# Patient Record
Sex: MALE | Race: WHITE | URBAN - METROPOLITAN AREA
[De-identification: names, ages, dates, MRNs, and addresses within clinical notes are randomized per-mention and may not be internally consistent; named-entity substitution may affect disease eponyms.]

---
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
## 2021-08-14 ENCOUNTER — NURSE ONLY (OUTPATIENT)
Dept: CARDIOLOGY CLINIC | Age: 21
End: 2021-08-14
Payer: COMMERCIAL

## 2021-08-14 DIAGNOSIS — Z02.5 SPORTS PHYSICAL: Primary | ICD-10-CM

## 2021-08-14 PROCEDURE — 93000 ELECTROCARDIOGRAM COMPLETE: CPT | Performed by: INTERNAL MEDICINE

## 2022-08-10 DIAGNOSIS — Z02.5 SPORTS PHYSICAL: Primary | ICD-10-CM

## 2022-08-10 PROCEDURE — 93000 ELECTROCARDIOGRAM COMPLETE: CPT | Performed by: INTERNAL MEDICINE

## 2022-10-10 ENCOUNTER — OFFICE VISIT (OUTPATIENT)
Dept: ORTHOPEDIC SURGERY | Age: 22
End: 2022-10-10
Payer: COMMERCIAL

## 2022-10-10 VITALS — HEIGHT: 73 IN | BODY MASS INDEX: 25.98 KG/M2 | WEIGHT: 196 LBS

## 2022-10-10 DIAGNOSIS — M25.551 RIGHT HIP PAIN: Primary | ICD-10-CM

## 2022-10-10 DIAGNOSIS — M89.8X8 ILIAC CREST BONE PAIN: ICD-10-CM

## 2022-10-10 PROCEDURE — 99204 OFFICE O/P NEW MOD 45 MIN: CPT | Performed by: EMERGENCY MEDICINE

## 2022-10-10 PROCEDURE — G8419 CALC BMI OUT NRM PARAM NOF/U: HCPCS | Performed by: EMERGENCY MEDICINE

## 2022-10-10 PROCEDURE — G8484 FLU IMMUNIZE NO ADMIN: HCPCS | Performed by: EMERGENCY MEDICINE

## 2022-10-10 PROCEDURE — G8427 DOCREV CUR MEDS BY ELIG CLIN: HCPCS | Performed by: EMERGENCY MEDICINE

## 2022-10-10 PROCEDURE — 1036F TOBACCO NON-USER: CPT | Performed by: EMERGENCY MEDICINE

## 2022-10-10 ASSESSMENT — ENCOUNTER SYMPTOMS
RHINORRHEA: 0
COUGH: 0
ABDOMINAL PAIN: 0
SHORTNESS OF BREATH: 0

## 2022-10-10 NOTE — PROGRESS NOTES
NEW PATIENT VISIT  CC: Hip Pain (RIGHT HIP)    Referring Provider: No ref. provider found    HPI:    Katie Lynne is a 25 y.o. male who presents for evaluation of right hip pain. In a game 3 days ago, he was hit in the right shoulder and felt like he twisted wrong and then had onset of right hip pain afterwards. There was concern that he may have been hit in the right hip. However, after reviewing the video, no trauma to the right hip. He reports pain over the right iliac crest and right ASIS. He had been using tape over the affected area but then had an allergic reaction. He reports 3 out of 10 right sided pain. No particular hip joint pain. He has never had anything like this before. No other complaints. History reviewed. No pertinent past medical history. Social History  Plays ice hockey at [employer]    Medications  No current outpatient medications on file. No current facility-administered medications for this visit. Allergies  No Known Allergies    Review of Systems:  Review of Systems   Constitutional:  Negative for chills and fever. HENT:  Negative for congestion and rhinorrhea. Respiratory:  Negative for cough and shortness of breath. Cardiovascular:  Negative for chest pain. Gastrointestinal:  Negative for abdominal pain. Musculoskeletal:  Negative for joint swelling and myalgias. Right hip pain   Skin:  Negative for rash. Neurological:  Negative for dizziness and light-headedness.      Physical Examination:  General: Well appearing male, in no acute distress  Respiratory: Normal respiratory effort  Cardiovascular: No visual or palpable edema  Skin: no identified rashes, no induration, erythema or cyanosis  Neurologic: Light touch sensation is intact, no allodynia or hyperalgesia  Gait: Normal gait and station  Extremities: No evidence of clubbing, cyanosis, tenosynovitis or nail pitting  MSK: Right hip/pelvis: Tenderness to palpation over the iliac crest and ASIS, no pain with range of motion of the hip, no instability of the hip, mild exacerbation of symptoms with oblique stressing, no palpable hernia, no palpable step-offs    Radiology:  3 view X-rays of the right hip and pelvis dated 10/10/2022 were reviewed independently and discussed with the patient. The films revealed: No acute osseous abnormalities    Assessment/Treatment Plan: Johanna Judd is a 25 y.o. male with:    1. Right hip pain  -     XR HIP 2-3 VW W PELVIS RIGHT; Future  -     MRI PELVIS WO CONTRAST; Future  2. Iliac crest bone pain    Patient was seen and examined in the office today. He is complaining of right hip pain. He is not particularly painful within the right hip joint. Instead, he has tenderness to palpation over the right iliac crest and right ASIS. Concern for a hip pointer or muscle strain. X-rays are negative for acute pathology. At this time, I would like to proceed with an MRI right pelvis. Symptom control at home. Limited activity secondary to pain. We will see the patient back in 1 week for MRI review. Discussed with patient's . Orders Placed This Encounter   Procedures    XR HIP 2-3 VW W PELVIS RIGHT     Standing Status:   Future     Number of Occurrences:   1     Standing Expiration Date:   11/10/2022     Order Specific Question:   Reason for exam:     Answer:   Pain    MRI PELVIS WO CONTRAST     Right iliac crest and ASIS pain, attention to right ASIS and iliac crest     Standing Status:   Future     Standing Expiration Date:   10/10/2023     Scheduling Instructions:      [facility] [place]     Order Specific Question:   Reason for exam:     Answer:   Right iliac crest and ASIS pain, attention to right     Order Specific Question:   What is the sedation requirement? Answer:   None       Follow-up:   Return in about 1 week (around 10/17/2022) for MRI review.   Sooner with any problems, questions, concerns, or worsening symptoms. Electronically signed by Juan Kolb MD on 10/10/2022 at 12:02 PM.    Disclaimer: This note was dictated with voice recognition software. Though review and correction are routine, we apologize for any errors.

## 2022-10-17 ENCOUNTER — OFFICE VISIT (OUTPATIENT)
Dept: ORTHOPEDIC SURGERY | Age: 22
End: 2022-10-17
Payer: COMMERCIAL

## 2022-10-17 VITALS — WEIGHT: 196 LBS | BODY MASS INDEX: 25.98 KG/M2 | HEIGHT: 73 IN

## 2022-10-17 DIAGNOSIS — S39.011D STRAIN OF ABDOMINAL WALL, SUBSEQUENT ENCOUNTER: ICD-10-CM

## 2022-10-17 DIAGNOSIS — M89.8X8 ILIAC CREST BONE PAIN: ICD-10-CM

## 2022-10-17 DIAGNOSIS — M25.551 RIGHT HIP PAIN: Primary | ICD-10-CM

## 2022-10-17 PROCEDURE — 99213 OFFICE O/P EST LOW 20 MIN: CPT | Performed by: EMERGENCY MEDICINE

## 2022-10-17 PROCEDURE — G8484 FLU IMMUNIZE NO ADMIN: HCPCS | Performed by: EMERGENCY MEDICINE

## 2022-10-17 PROCEDURE — G8419 CALC BMI OUT NRM PARAM NOF/U: HCPCS | Performed by: EMERGENCY MEDICINE

## 2022-10-17 PROCEDURE — 1036F TOBACCO NON-USER: CPT | Performed by: EMERGENCY MEDICINE

## 2022-10-17 PROCEDURE — G8427 DOCREV CUR MEDS BY ELIG CLIN: HCPCS | Performed by: EMERGENCY MEDICINE

## 2022-10-17 NOTE — PROGRESS NOTES
FOLLOW UP VISIT    Chief Complaint   Patient presents with    Hip Pain     RIGHT HIP-MRI PELVIS       HPI:    Meek Seay is a 25 y.o. male who presents for MRI review     At the last visit on 10/10/2022, the patient was complaining of right iliac crest pain after an injury. MRI was ordered. Since the last visit, Meek Seay has noted significant improvement of his symptoms. He has been lifting in the weight room and skating a small amount. He is very happy with his progression. He still has a little bit of pain with twisting motion of the trunk. He denies any other complaints. Medical History  Patient's medications, allergies, past medical, surgical, social, and family histories were reviewed and updated as appropriate. Physical Examination:  General: Well appearing male, in no acute distress  Respiratory: Normal respiratory effort  Cardiovascular: No visual or palpable edema  Skin: no identified rashes, no induration, erythema or cyanosis  Neurologic: Light touch sensation is intact, no allodynia or hyperalgesia  Gait: Normal gait and station  Extremities: No evidence of clubbing, cyanosis, tenosynovitis or nail pitting  MSK:  Right hip/pelvis: Minimal tenderness to palpation over the iliac crest, mild exacerbation of symptoms with oblique stressing, no palpable hernia, no palpable step-offs      Radiology:  MRI images of the pelvis dated 10/12/2022 were reviewed independently and discussed with the patient. The films revealed:  1. Anterior abdominal wall muscle strain (transverse abdominis and external oblique muscles) and avulsion of the musculature (internal oblique muscle) at the iliac crest insertion. Associated small volume fluid at the iliac crest.  2.  Intact anterior superior iliac spine and sartorius and tensor fascia west tendons at the ASIS origin as clinically question. 3.  No fracture or bone marrow edema. 4.  Preserved bilateral hip articular cartilage.   5.  Intact jimena bilaterally. Assessment/Treatment Plan: Meek Seay is a 25 y.o. male with:    1. Right hip pain  2. Iliac crest bone pain  3. Strain of abdominal wall, subsequent encounter    Patient was seen and examined in the clinic today. He is presenting for MRI review. Abdominal wall muscle strain and avulsion of the internal oblique muscle at the iliac crest insertion. This is consistent with the patient's clinical presentation upon initial visit on 10/10/2022. He has had a significant improvement of his symptoms. He has been tolerating slow progression back to activity. At this time, activity as tolerated. Discussed with several other [facility] team physicians. This will likely scar down on its own. Continue to progress activity as tolerated. Follow-up as needed. No orders of the defined types were placed in this encounter. Follow-up:   Return if symptoms worsen or fail to improve. Sooner with any problems, questions, concerns, or worsening symptoms. Electronically signed by Luis Miguel Zaragoza MD on 10/17/2022 at 12:48 PM.      Disclaimer: This note was dictated with voice recognition software. Though review and correction are routine, we apologize for any errors.

## 2022-10-24 ENCOUNTER — OFFICE VISIT (OUTPATIENT)
Dept: ORTHOPEDIC SURGERY | Age: 22
End: 2022-10-24
Payer: COMMERCIAL

## 2022-10-24 VITALS — HEIGHT: 73 IN | BODY MASS INDEX: 25.98 KG/M2 | WEIGHT: 196 LBS

## 2022-10-24 DIAGNOSIS — M25.551 RIGHT HIP PAIN: Primary | ICD-10-CM

## 2022-10-24 DIAGNOSIS — M89.8X8 ILIAC CREST BONE PAIN: ICD-10-CM

## 2022-10-24 PROCEDURE — G8427 DOCREV CUR MEDS BY ELIG CLIN: HCPCS | Performed by: EMERGENCY MEDICINE

## 2022-10-24 PROCEDURE — 1036F TOBACCO NON-USER: CPT | Performed by: EMERGENCY MEDICINE

## 2022-10-24 PROCEDURE — G8484 FLU IMMUNIZE NO ADMIN: HCPCS | Performed by: EMERGENCY MEDICINE

## 2022-10-24 PROCEDURE — G8419 CALC BMI OUT NRM PARAM NOF/U: HCPCS | Performed by: EMERGENCY MEDICINE

## 2022-10-24 PROCEDURE — 99212 OFFICE O/P EST SF 10 MIN: CPT | Performed by: EMERGENCY MEDICINE

## 2022-10-24 NOTE — PROGRESS NOTES
FOLLOW UP VISIT    Chief Complaint   Patient presents with    Hip Pain     RIGHT HIP-FEELING BETTER       HPI:    Mary Oviedo is a 25 y.o. male who presents for MRI review     At the last visit on 10/17/2022, MRI was reviewed with the patient. He had an in full shin of the internal oblique muscle off the iliac crest with an abdominal wall strain. He was progressing through noncontact activity. Since the last visit, Mary Oviedo has noted complete of his symptoms. He has been in full sports related activity. They have not been doing any contact so he has not tried any contact activity. He is completely asymptomatic and is very happy with his progression. Medical History  Patient's medications, allergies, past medical, surgical, social, and family histories were reviewed and updated as appropriate. Physical Examination:  General: Well appearing male, in no acute distress  Respiratory: Normal respiratory effort  Cardiovascular: No visual or palpable edema  Skin: no identified rashes, no induration, erythema or cyanosis  Neurologic: Light touch sensation is intact, no allodynia or hyperalgesia  Gait: Normal gait and station  Extremities: No evidence of clubbing, cyanosis, tenosynovitis or nail pitting  MSK:  Right hip/pelvis: non-tender to palpation over the iliac crest, no palpable hernia, no palpable step-offs      Radiology:  MRI images of the pelvis dated 10/12/2022 were reviewed independently and discussed with the patient. The films revealed:  1. Anterior abdominal wall muscle strain (transverse abdominis and external oblique muscles) and avulsion of the musculature (internal oblique muscle) at the iliac crest insertion. Associated small volume fluid at the iliac crest.  2.  Intact anterior superior iliac spine and sartorius and tensor fascia west tendons at the ASIS origin as clinically question. 3.  No fracture or bone marrow edema. 4.  Preserved bilateral hip articular cartilage.   5. Intact jimena bilaterally. Assessment/Treatment Plan: Katie Lynne is a 25 y.o. male with:    1. Right hip pain  2. Iliac crest bone pain    Patient was seen and examined in the clinic today. His MRI revealed abdominal wall muscle strain and avulsion of the internal oblique muscle at the iliac crest insertion. He has been progressing through noncontact activity. He has a reassuring physical exam.  At this time, he is cleared to progress in full contact sports related activity. No orders of the defined types were placed in this encounter. Follow-up:   Return if symptoms worsen or fail to improve. Sooner with any problems, questions, concerns, or worsening symptoms. Electronically signed by Fran Malhotra MD on 10/24/2022 at 12:40 PM.      Disclaimer: This note was dictated with voice recognition software. Though review and correction are routine, we apologize for any errors.

## 2022-11-10 ENCOUNTER — OFFICE VISIT (OUTPATIENT)
Dept: PRIMARY CARE CLINIC | Age: 22
End: 2022-11-10
Payer: COMMERCIAL

## 2022-11-10 VITALS — WEIGHT: 196 LBS | BODY MASS INDEX: 25.98 KG/M2 | HEIGHT: 73 IN

## 2022-11-10 DIAGNOSIS — B34.9: Primary | ICD-10-CM

## 2022-11-10 PROCEDURE — G8419 CALC BMI OUT NRM PARAM NOF/U: HCPCS | Performed by: STUDENT IN AN ORGANIZED HEALTH CARE EDUCATION/TRAINING PROGRAM

## 2022-11-10 PROCEDURE — 1036F TOBACCO NON-USER: CPT | Performed by: STUDENT IN AN ORGANIZED HEALTH CARE EDUCATION/TRAINING PROGRAM

## 2022-11-10 PROCEDURE — G8428 CUR MEDS NOT DOCUMENT: HCPCS | Performed by: STUDENT IN AN ORGANIZED HEALTH CARE EDUCATION/TRAINING PROGRAM

## 2022-11-10 PROCEDURE — G8484 FLU IMMUNIZE NO ADMIN: HCPCS | Performed by: STUDENT IN AN ORGANIZED HEALTH CARE EDUCATION/TRAINING PROGRAM

## 2022-11-10 PROCEDURE — 99203 OFFICE O/P NEW LOW 30 MIN: CPT | Performed by: STUDENT IN AN ORGANIZED HEALTH CARE EDUCATION/TRAINING PROGRAM

## 2022-11-10 NOTE — PROGRESS NOTES
CC:   Chief Complaint   Patient presents with    Oral Pain     Gums are inflamed       HPI  Severa Slider is a 25 y.o. male here for sores in mouth and gum inflammation. He was sick this past weekend with a viral illness but tested negative for COVID and the flu. As he started to improve from viral symptoms he then developed some oral aphthous ulcers on his lips and cheek as well as on the palate. He also notes that his gums became little bit inflamed and has had some mild pain over the gums and with these ulcers. He has been doing a 50-50 mixture of water and hydrogen peroxide and has been treating this as gingivitis. He denies fevers, chills, sweats, night sweats. Review of Systems  As above. Vitals:    11/10/22 1509   Weight: 196 lb (88.9 kg)   Height: 6' 1\" (1.854 m)     Physical Exam  Vitals reviewed. Constitutional:       General: He is not in acute distress. Appearance: He is not ill-appearing or toxic-appearing. HENT:      Head: Normocephalic and atraumatic. Nose: No congestion or rhinorrhea. Mouth/Throat:      Mouth: Mucous membranes are moist.      Pharynx: No oropharyngeal exudate or posterior oropharyngeal erythema. Comments: He does have an aphthous ulcer on the anterior lower lip and on the right inner cheek as well as on the palate. There is some erythema without swelling of the lower gums. Eyes:      General: No scleral icterus. Right eye: No discharge. Left eye: No discharge. Extraocular Movements: Extraocular movements intact. Conjunctiva/sclera: Conjunctivae normal.      Pupils: Pupils are equal, round, and reactive to light. Cardiovascular:      Rate and Rhythm: Normal rate and regular rhythm. Heart sounds: No murmur heard. No friction rub. Pulmonary:      Effort: No respiratory distress. Breath sounds: Normal breath sounds. No wheezing, rhonchi or rales. Abdominal:      General: There is no distension. Palpations: Abdomen is soft. Tenderness: There is no abdominal tenderness. Musculoskeletal:      Cervical back: Normal range of motion. Right lower leg: No edema. Left lower leg: No edema. Skin:     General: Skin is warm and dry. Neurological:      General: No focal deficit present. Mental Status: He is alert and oriented to person, place, and time. Psychiatric:         Mood and Affect: Mood normal.         Behavior: Behavior normal.       A/P  1. Viral disease of oral mucosa  I suspect that he may have recently had a coxsackievirus. He does not have any lesions on his hands or feet but he is oral lesions appear to be consistent with viral oral lesions. He denies any new sexual contacts or the possibility that he could have herpes and clinically does not look like this. I recommended conservative treatment and pain medications as needed. If his symptoms fail to improve much by Monday then he may consider resuming the hydrogen peroxide mixture and getting into see a dentist.    Estela Downing MD  Internal Medicine and Sports Medicine    Please note that this chart was at least partially generated using dragon dictation software. Although every effort was made to ensure the accuracy of this automated transcription, some errors in transcription may have occurred.

## 2023-03-29 ENCOUNTER — OFFICE VISIT (OUTPATIENT)
Dept: ORTHOPEDIC SURGERY | Age: 23
End: 2023-03-29

## 2023-03-29 VITALS — BODY MASS INDEX: 25.98 KG/M2 | WEIGHT: 196 LBS | HEIGHT: 73 IN

## 2023-03-29 DIAGNOSIS — M25.562 LEFT KNEE PAIN, UNSPECIFIED CHRONICITY: Primary | ICD-10-CM

## 2023-03-30 NOTE — PROGRESS NOTES
3/29/2023     Reason for visit:  Left knee injury sustained on 3/11/2023    History of Present Illness:  Patient is a 36-year-old male [address]  who presents for evaluation. He injured himself on 3/11/2023. At that time he twisted his knee and felt immediate pain. Since then he has had persistent pain medially. An MRI was performed demonstrating an MCL sprain. He has been getting rehabilitation for this. He does report significant improvement. No catching or locking. Improved stability. Medical History:  No past medical history on file. No past surgical history on file. No family history on file. Social History     Socioeconomic History    Marital status: Single     Spouse name: Not on file    Number of children: Not on file    Years of education: Not on file    Highest education level: Not on file   Occupational History    Not on file   Tobacco Use    Smoking status: Never    Smokeless tobacco: Never   Substance and Sexual Activity    Alcohol use: Not on file    Drug use: Not on file    Sexual activity: Not on file   Other Topics Concern    Not on file   Social History Narrative    Not on file     Social Determinants of Health     Financial Resource Strain: Not on file   Food Insecurity: Not on file   Transportation Needs: Not on file   Physical Activity: Not on file   Stress: Not on file   Social Connections: Not on file   Intimate Partner Violence: Not on file   Housing Stability: Not on file      No current outpatient medications on file prior to visit. No current facility-administered medications on file prior to visit. No Known Allergies     Review of Systems:  Constitutional: Patient is adequately groomed with no evidence of malnutrition  Mental Status: The patient is oriented to time, place and person. The patient's mood and affect are appropriate.   Lymphatic: The lymphatic examination bilaterally reveals all areas to be without enlargement or

## 2024-03-11 ENCOUNTER — OFFICE VISIT (OUTPATIENT)
Dept: ORTHOPEDIC SURGERY | Age: 24
End: 2024-03-11
Payer: COMMERCIAL

## 2024-03-11 VITALS — HEIGHT: 73 IN | BODY MASS INDEX: 25.98 KG/M2 | WEIGHT: 196 LBS

## 2024-03-11 DIAGNOSIS — J06.9 UPPER RESPIRATORY TRACT INFECTION, UNSPECIFIED TYPE: Primary | ICD-10-CM

## 2024-03-11 PROCEDURE — G8419 CALC BMI OUT NRM PARAM NOF/U: HCPCS | Performed by: EMERGENCY MEDICINE

## 2024-03-11 PROCEDURE — G8427 DOCREV CUR MEDS BY ELIG CLIN: HCPCS | Performed by: EMERGENCY MEDICINE

## 2024-03-11 PROCEDURE — G8484 FLU IMMUNIZE NO ADMIN: HCPCS | Performed by: EMERGENCY MEDICINE

## 2024-03-11 PROCEDURE — 99212 OFFICE O/P EST SF 10 MIN: CPT | Performed by: EMERGENCY MEDICINE

## 2024-03-11 PROCEDURE — 1036F TOBACCO NON-USER: CPT | Performed by: EMERGENCY MEDICINE

## 2024-03-11 RX ORDER — AZITHROMYCIN 250 MG/1
TABLET, FILM COATED ORAL
Qty: 6 TABLET | Refills: 0 | Status: SHIPPED | OUTPATIENT
Start: 2024-03-11 | End: 2024-03-21

## 2024-03-11 ASSESSMENT — ENCOUNTER SYMPTOMS
SORE THROAT: 0
ABDOMINAL PAIN: 0
SHORTNESS OF BREATH: 0

## 2024-03-11 NOTE — ASSESSMENT & PLAN NOTE
Patient is seen and examined in the office today.  He is presenting with nasal congestion and postnasal drip cough.  Symptoms for the last several days.  No fevers.    Physical exam is reassuring without any red flags.    We discussed potential workup and treatment plan.  He has very low suspicion for influenza and COVID-19.  I am concerned for possible bacterial sinusitis.  Therefore, I would like to treat him with a course of Z-Tyron.  He is already taking Mucinex.  He can continue this as needed.  Ibuprofen for nasal congestion.    As long as he does not develop a fever, he can continue sports related activity as tolerated.

## 2024-03-11 NOTE — PROGRESS NOTES
cyanosis, tenosynovitis or nail pitting  MSK: Moves all 4 extremities normally, no gross deformities  Lymph: No lymphadenopathy    Radiology: no new imaging    Assessment/Treatment Plan: Gómez Hurtado is a 23 y.o. male with:    1. Upper respiratory tract infection, unspecified type  Assessment & Plan:  Patient is seen and examined in the office today.  He is presenting with nasal congestion and postnasal drip cough.  Symptoms for the last several days.  No fevers.    Physical exam is reassuring without any red flags.    We discussed potential workup and treatment plan.  He has very low suspicion for influenza and COVID-19.  I am concerned for possible bacterial sinusitis.  Therefore, I would like to treat him with a course of Z-Tyron.  He is already taking Mucinex.  He can continue this as needed.  Ibuprofen for nasal congestion.    As long as he does not develop a fever, he can continue sports related activity as tolerated.      Follow-up:   Return if symptoms worsen or fail to improve.  Sooner with any problems, questions, concerns, or worsening symptoms.      Electronically signed by Maru Boles MD on 3/11/2024 at 10:02 AM.    Disclaimer:  This note was dictated with voice recognition software.  Though review and correction are routine, we apologize for any errors.

## 2024-08-12 ENCOUNTER — NURSE ONLY (OUTPATIENT)
Dept: CARDIOLOGY CLINIC | Age: 24
End: 2024-08-12
Payer: COMMERCIAL

## 2024-08-12 DIAGNOSIS — Z02.5 SPORTS PHYSICAL: Primary | ICD-10-CM

## 2024-08-12 PROCEDURE — 93000 ELECTROCARDIOGRAM COMPLETE: CPT | Performed by: INTERNAL MEDICINE
